# Patient Record
Sex: MALE | URBAN - METROPOLITAN AREA
[De-identification: names, ages, dates, MRNs, and addresses within clinical notes are randomized per-mention and may not be internally consistent; named-entity substitution may affect disease eponyms.]

---

## 2020-06-04 ENCOUNTER — APPOINTMENT (OUTPATIENT)
Age: 30
Setting detail: DERMATOLOGY
End: 2020-06-22

## 2020-06-04 DIAGNOSIS — D22 MELANOCYTIC NEVI: ICD-10-CM

## 2020-06-04 DIAGNOSIS — D485 NEOPLASM OF UNCERTAIN BEHAVIOR OF SKIN: ICD-10-CM

## 2020-06-04 PROBLEM — D48.5 NEOPLASM OF UNCERTAIN BEHAVIOR OF SKIN: Status: ACTIVE | Noted: 2020-06-04

## 2020-06-04 PROBLEM — D22.5 MELANOCYTIC NEVI OF TRUNK: Status: ACTIVE | Noted: 2020-06-04

## 2020-06-04 PROCEDURE — 11102 TANGNTL BX SKIN SINGLE LES: CPT

## 2020-06-04 PROCEDURE — OTHER MIPS QUALITY: OTHER

## 2020-06-04 PROCEDURE — 11103 TANGNTL BX SKIN EA SEP/ADDL: CPT

## 2020-06-04 PROCEDURE — OTHER BIOPSY BY SHAVE METHOD: OTHER

## 2020-06-04 PROCEDURE — 99202 OFFICE O/P NEW SF 15 MIN: CPT | Mod: 25

## 2020-06-04 PROCEDURE — OTHER COUNSELING: OTHER

## 2020-06-04 ASSESSMENT — LOCATION SIMPLE DESCRIPTION DERM
LOCATION SIMPLE: RIGHT UPPER BACK
LOCATION SIMPLE: LEFT UPPER BACK
LOCATION SIMPLE: ABDOMEN
LOCATION SIMPLE: LEFT LOWER BACK

## 2020-06-04 ASSESSMENT — LOCATION DETAILED DESCRIPTION DERM
LOCATION DETAILED: LEFT INFERIOR MEDIAL MIDBACK
LOCATION DETAILED: LEFT LATERAL ABDOMEN
LOCATION DETAILED: RIGHT MID-UPPER BACK
LOCATION DETAILED: LEFT SUPERIOR UPPER BACK

## 2020-06-04 ASSESSMENT — LOCATION ZONE DERM: LOCATION ZONE: TRUNK

## 2020-06-04 NOTE — PROCEDURE: BIOPSY BY SHAVE METHOD
Bill [id number] For Specimen Handling/Conveyance To Laboratory?: no
Curettage Text: The wound bed was treated with curettage after the biopsy was performed.
Validate Note Data (See Information Below): Yes
Detail Level: Detailed
Size Of Lesion In Cm: 0
Information: Selecting Yes will display possible errors in your note based on the variables you have selected. This validation is only offered as a suggestion for you. PLEASE NOTE THAT THE VALIDATION TEXT WILL BE REMOVED WHEN YOU FINALIZE YOUR NOTE. IF YOU WANT TO FAX A PRELIMINARY NOTE YOU WILL NEED TO TOGGLE THIS TO 'NO' IF YOU DO NOT WANT IT IN YOUR FAXED NOTE.
Notification Instructions: Patient will be notified of biopsy results. However, patient instructed to call the office if not contacted within 2 weeks.
Anesthesia Volume In Cc (Will Not Render If 0): 0.5
Electrodesiccation Text: The wound bed was treated with electrodesiccation after the biopsy was performed.
Biopsy Method: Dermablade
Consent: Written consent was obtained and risks were reviewed including but not limited to scarring, infection, bleeding, scabbing, incomplete removal, nerve damage and allergy to anesthesia.
Silver Nitrate Text: The wound bed was treated with silver nitrate after the biopsy was performed.
Billing Type: Third-Party Bill
Electrodesiccation And Curettage Text: The wound bed was treated with electrodesiccation and curettage after the biopsy was performed.
Depth Of Biopsy: dermis
Wound Care: Petrolatum
Anesthesia Type: 1% lidocaine with epinephrine
Dressing: bandage
Post-Care Instructions: I reviewed with the patient in detail post-care instructions. Patient is to keep the biopsy site dry overnight, and then apply bacitracin twice daily until healed. Patient may apply hydrogen peroxide soaks to remove any crusting.
Biopsy Type: H and E
Cryotherapy Text: The wound bed was treated with cryotherapy after the biopsy was performed.
Type Of Destruction Used: Curettage
Hemostasis: Drysol